# Patient Record
Sex: MALE | Race: WHITE | HISPANIC OR LATINO | Employment: FULL TIME | URBAN - METROPOLITAN AREA
[De-identification: names, ages, dates, MRNs, and addresses within clinical notes are randomized per-mention and may not be internally consistent; named-entity substitution may affect disease eponyms.]

---

## 2017-07-17 ENCOUNTER — ALLSCRIPTS OFFICE VISIT (OUTPATIENT)
Dept: OTHER | Facility: OTHER | Age: 62
End: 2017-07-17

## 2018-01-12 VITALS
HEIGHT: 70 IN | SYSTOLIC BLOOD PRESSURE: 167 MMHG | BODY MASS INDEX: 31.09 KG/M2 | WEIGHT: 217.13 LBS | DIASTOLIC BLOOD PRESSURE: 94 MMHG | HEART RATE: 58 BPM

## 2018-01-15 ENCOUNTER — ALLSCRIPTS OFFICE VISIT (OUTPATIENT)
Dept: OTHER | Facility: OTHER | Age: 63
End: 2018-01-15

## 2018-01-16 NOTE — PROGRESS NOTES
Assessment   1  Cervical myelopathy with cervical radiculopathy (721 1) (M47 12)    Plan   Cervical myelopathy with cervical radiculopathy    · Follow-up visit in 6 months Evaluation and Treatment  Follow-up  Status: Hold For -    Scheduling  Requested for: [id number]   Ordered; For: Cervical myelopathy with cervical radiculopathy; Ordered By: Dvaid Lainez Performed:  Due: [id number]    Discussion/Summary   Discussion Summary:    Eden Isabel anticipates returning back to work early next month and hopes to retire within the next 8-12 months  Does understand that due to his cervical disease/stenosis he is at risk for paralysis  If he notes increasing symptoms of balance difficulty weakness or numbness he should notify me  Chief Complaint   Chief Complaint Free Text Note Form: The patient returns today following up on neck pain and right leg numbness      History of Present Illness   HPI: Nata Pantoja returns in follow-up today  He reports that he underwent triple bypass surgery in late October after abnormalities were found on a stress test  He states he is going to cardiac rehab been recovering well  Occasionally notes issues of neck pain but states that by modifying his activities he has been able to minimize his symptoms  He does occasionally notes some numbness and tingling in the right leg and reports that sometimes prior to sleep at night his left leg will spasm  He does have some urinary urgency and anticipates seeing the neurologist in the near future  He has not noticed any problems with his balance  His blood pressure was elevated today and states that typically when he starts cardiac rehab the blood pressure is high but usually it normalizes by the end of therapy  He has discussed this with his cardiologist and anticipates doing it again in the near future      Review of Systems   Neurological ROS:      Cardiovascular: no chest pain or pressure-- and-- no palpitations present        Respiratory: no shortness of breath with or without exertion  Gastrointestinal: no abdominal pain  Genitourinary: urinary hesitancy  Musculoskeletal: head/neck/back pain-- and-- neck pain  Integumentary no rash:-- and-- no skin lesions  Psychiatric: sleep problems  Hematologic/Lymphatic: no unusual bleeding  Neurological General: trouble falling asleep-- and-- waking up at night, but-- no headache  Neurological Mental Status: no memory problems  Neurological Cranial Nerves: no vertigo or dizziness  Neurological Motor findings include: twitching-- and-- cramping(pre/post exercise)  Neurological Coordination: no vertigo or dizziness  Neurological Sensory: numbness  Neurological Gait: difficulty walking, but-- has not had falls  Active Problems   1  Cervical disc herniation (722 0) (M50 20)   2  Cervical myelopathy with cervical radiculopathy (721 1) (M47 12)   3  Cervical radiculopathy (723 4) (M54 12)   4  Cervical spinal cord compression (336 9) (G95 20)   5  Cervical spinal stenosis (723 0) (M48 02)   6  Hypercholesterolemia (272 0) (E78 00)   7  Lumbar radicular syndrome (724 4) (M54 16)   8  Overactive bladder (596 51) (N32 81)    Past Medical History   1  History of Arm pain, right (729 5) (M79 601)   2  History of CAD (coronary artery disease) (414 00) (I25 10)   3  History of arthritis (V13 4) (Z87 39)   4  History of blurred vision (V12 49) (Z86 69)   5  History of hypertension (V12 59) (Z86 79)   6  History of Imbalance (781 2) (R26 89)   7  History of Left facial numbness (782 0) (R20 0)   8  History of Neck pain (723 1) (M54 2)   9  History of Neck stiffness (723 5) (M43 6)   10  History of Right arm weakness (729 89) (Z43 921)  Active Problems And Past Medical History Reviewed: The active problems and past medical history were reviewed and updated today  Surgical History   1  History of Cath Stent Placement  Surgical History Reviewed:     The surgical history was reviewed and updated today  Family History   Mother    1  Family history of dementia (V17 2) (Z81 8)  Father    2  FH: heart attack (V17 3) (Z82 49)  Maternal Grandmother    3  Family history of diabetes mellitus (V18 0) (Z83 3)  Paternal Grandmother    4  Family history of Alzheimer's disease (V17 2) (Z82 0)  Paternal Grandfather    11  Family history of death of natural cause (V19 8) (Z80 80)  Family History Reviewed: The family history was reviewed and updated today  Social History    · Currently sexually active   · Former smoker (V15 82) (Z87 891)   · High school or GED   ·    · No drug use   · No known risk factors   · No known STD risk factors   · Occasional alcohol use   · Occupation   · Six children  Social History Reviewed: The social history was reviewed and updated today  Current Meds    1  Aspirin Low Dose 81 MG TABS; TAKE 1 TABLET DAILY; Therapy: (Recorded:22Jan2016) to Recorded   2  CoQ10 100 MG Oral Capsule; TAKE CAPSULE  PRN; Therapy: (Recorded:22Jan2016) to Recorded   3  Daily Multi Oral Tablet; TAKE 1 TABLET DAILY; Therapy: ([facility] [place]) to Recorded   4  Fish Oil CAPS; take 1 capsule daily; Therapy: ([facility] [place]) to Recorded   5  Lisinopril 20 MG Oral Tablet; TAKE 1 TABLET EVERY MORNING; Therapy: (Recorded:15Jan2018) to Recorded   6  Metoprolol Tartrate 50 MG Oral Tablet; TAKE 1 TABLET DAILY; Therapy: (Recorded:13Nov2015) to Recorded   7  Plavix 75 MG Oral Tablet; TAKE 1 TABLET DAILY; Therapy: (Recorded:13Nov2015) to Recorded   8  Tamsulosin HCl 0 4 MG CAPS; TAKE 1 CAPSULE DAILY; Therapy: (Recorded:22Jan2016) to Recorded   9  Vytorin 10-40 MG Oral Tablet; TAKE 1 TABLET DAILY AS DIRECTED; Therapy: (Recorded:22Jan2016) to Recorded  Medication List Reviewed: The medication list was reviewed and updated today  Allergies   1   No Known Drug Allergies    Vitals   Signs   Recorded: [id number] 12:48PM   Heart Rate: 62, L Radial  Pulse Quality: Regular, L Radial  Respiration: 16  Systolic: 238, LUE, Sitting  Diastolic: 111, LUE, Sitting  Height: 5 ft 10 in  Weight: 200 lb   BMI Calculated: 28 7  BSA Calculated: 2 09    Physical Exam        Constitutional      General appearance: No acute distress, well appearing and well nourished  Cardiovascular:  No carotid bruits     HEENT/NECK: Head is atraumatic normocephalic, neck is supple     NEUROLOGIC:     Mental Status: Awake and alert without aphasia     Cranial Nerves: Extraocular movements are full  Face is symmetrical     Motor:  No drift is noted on arm extension     Coordination:  Finger-to-nose testing is performed accurately  Romberg is negative    Gait is stable     Reflexes:  Trace in the biceps brachioradialis regions, 1+ at the right triceps and trace at the left triceps, trace at the knees and ankles      Signatures    Electronically signed by : Kristi Elmore MD; Bola 15 2018  1:13PM EST                       (Author)

## 2018-01-22 VITALS
HEART RATE: 62 BPM | SYSTOLIC BLOOD PRESSURE: 174 MMHG | HEIGHT: 70 IN | WEIGHT: 200 LBS | DIASTOLIC BLOOD PRESSURE: 110 MMHG | RESPIRATION RATE: 16 BRPM | BODY MASS INDEX: 28.63 KG/M2

## 2018-08-13 ENCOUNTER — OFFICE VISIT (OUTPATIENT)
Dept: NEUROLOGY | Facility: CLINIC | Age: 63
End: 2018-08-13
Payer: COMMERCIAL

## 2018-08-13 VITALS
WEIGHT: 202 LBS | HEART RATE: 48 BPM | DIASTOLIC BLOOD PRESSURE: 84 MMHG | SYSTOLIC BLOOD PRESSURE: 142 MMHG | HEIGHT: 70 IN | BODY MASS INDEX: 28.92 KG/M2

## 2018-08-13 DIAGNOSIS — M54.31 RIGHT SIDED SCIATICA: ICD-10-CM

## 2018-08-13 DIAGNOSIS — G95.20 CERVICAL SPINAL CORD COMPRESSION (HCC): Primary | ICD-10-CM

## 2018-08-13 PROCEDURE — 99214 OFFICE O/P EST MOD 30 MIN: CPT | Performed by: PSYCHIATRY & NEUROLOGY

## 2018-08-13 RX ORDER — LANOLIN ALCOHOL/MO/W.PET/CERES
1000 CREAM (GRAM) TOPICAL DAILY
COMMUNITY

## 2018-08-13 RX ORDER — ROSUVASTATIN CALCIUM 10 MG/1
1 TABLET, COATED ORAL DAILY
COMMUNITY

## 2018-08-13 RX ORDER — FINASTERIDE 5 MG/1
5 TABLET, FILM COATED ORAL DAILY
Refills: 11 | COMMUNITY
Start: 2018-06-27

## 2018-08-13 RX ORDER — EZETIMIBE AND SIMVASTATIN 10; 40 MG/1; MG/1
1 TABLET ORAL DAILY
COMMUNITY

## 2018-08-13 RX ORDER — EZETIMIBE 10 MG/1
1 TABLET ORAL DAILY
COMMUNITY

## 2018-08-13 NOTE — PROGRESS NOTES
Dwayne Reeder is a 61 y o  male with history of cervical spondylosis with cord compression now with numbness and pain in the right lower extremity    Assessment:  1  Cervical spinal cord compression (HCC)    2  Right sided sciatica        Plan:  Physical therapy  EMG right lower extremity  Follow-up 2 months    Discussion:  Kevyn De La Rosa has symptoms of right sciatica without back pain suggesting possible piriformis syndrome  Have recommended physical therapy, EMG of the right lower extremity and I will see him back in follow-up when these are completed  If his pain symptoms become more prominent would consider adding gabapentin      Subjective:    HPI  Kevyn De La Rosa reports that he has been exercising regularly and feels that his neck symptoms have improved a bit  He states on rare occasion he notes some numbness radiating toward his left cheek  He denies any worsening balance or weakness in his arms  He does report that since his cardiac surgery last year he has noticed a burning numb sensation radiating from his right heel up to his buttocks area posteriorly  He states that if he sits on a hard chair for any length of time he becomes symptomatic and on occasion notes with standing he worsened symptoms  He notes no definite weakness in the leg  He does not associate the symptoms with back pain  He states yesterday he was doing a lot of heavy yd work and did have some pain across his lower back but this seemed to go away with Aleve and did not notice any increasing symptoms in his right leg associated with this        Past Medical History:   Diagnosis Date    Arthritis     CAD (coronary artery disease)     history of     Cervical disc herniation     Cervical myelopathy with cervical radiculopathy     Cervical radiculopathy     Cervical spinal stenosis     Hypercholesterolemia     Hypertension     Lumbar radicular syndrome     Overactive bladder        Family History:  Family History   Problem Relation Age of Onset    Dementia Mother     Heart attack Father     Diabetes Maternal Grandmother     Alzheimer's disease Paternal Grandmother        Past Surgical History:  Past Surgical History:   Procedure Laterality Date    CORONARY ANGIOPLASTY WITH STENT PLACEMENT  02/2004    CORONARY ARTERY BYPASS GRAFT  10/31/2017    triple    LITHOTRIPSY Left 06/12/2018       Social History:   reports that he has quit smoking  He has never used smokeless tobacco  He reports that he drinks alcohol  He reports that he does not use drugs  Allergies:  Patient has no known allergies  Current Outpatient Prescriptions:     aspirin (ASPIRIN LOW DOSE) 81 MG tablet, Take 1 tablet by mouth daily, Disp: , Rfl:     cyanocobalamin (VITAMIN B-12) 1,000 mcg tablet, Take 1,000 mcg by mouth daily, Disp: , Rfl:     ezetimibe (ZETIA) 10 mg tablet, Take 1 tablet by mouth Daily, Disp: , Rfl:     ezetimibe-simvastatin (VYTORIN) 10-40 mg per tablet, Take 1 tablet by mouth daily, Disp: , Rfl:     finasteride (PROSCAR) 5 mg tablet, Take 5 mg by mouth daily, Disp: , Rfl: 11    Garlic 9564 MG CAPS, Take 1 capsule by mouth daily, Disp: , Rfl:     lisinopril (ZESTRIL) 20 mg tablet, Take 1 tablet by mouth every morning, Disp: , Rfl:     metoprolol succinate (TOPROL-XL) 50 mg 24 hr tablet, Take 50 mg by mouth daily, Disp: , Rfl: 3    Multiple Vitamins-Minerals (CENTRUM SILVER 50+MEN PO), Take 1 tablet by mouth daily, Disp: , Rfl:     Omega-3 Fatty Acids (FISH OIL) 645 MG CAPS, Take 1 capsule by mouth daily, Disp: , Rfl:     rosuvastatin (CRESTOR) 10 MG tablet, Take 1 tablet by mouth daily, Disp: , Rfl:     I have reviewed the past medical, social and family history, current medications, allergies, vitals, review of systems and updated this information as appropriate today     Objective:    Vitals:  Blood pressure 142/84, pulse (!) 48, height 5' 9 5" (1 765 m), weight 91 6 kg (202 lb)      Physical Exam    Neurological Exam  GENERAL: Well-developed well-nourished man in no acute distress  HEENT/NECK: Head is atraumatic normocephalic, neck is supple  CARDIOVASCULAR:  No Carotid bruit  MUSCULOSKELETAL:  Back is straight and nontender  He is able to flex to touch to the proximal 1/3 of the shin  He does report some pain lateralized to the right low lumbar region with lateral rotation to the left  Straight leg raising is positive on the right at 70° and negative on the left at 80°  With compression of the right sciatic notch she does report tingling sensation down the right leg  NEUROLOGIC:  Mental Status: Awake and alert without aphasia  Cranial Nerves: Extraocular movements are full  Face is symmetrical  Motor:  No focal weakness is noted motor testing  No drift is noted  Coordination:  Finger-to-nose testing is performed accurately  Romberg is negative  Gait is stable  Reflexes:  Trace to 1 and symmetrical in the knee jerk and ankle jerk regions            ROS:    Review of Systems   Constitutional: Negative for chills, fatigue and fever  HENT: Negative for congestion, postnasal drip, tinnitus and trouble swallowing  Eyes: Negative for pain and visual disturbance  Respiratory: Negative for shortness of breath and wheezing  Cardiovascular: Negative  Gastrointestinal: Negative for nausea and vomiting  Endocrine: Negative for cold intolerance and heat intolerance  Genitourinary: Negative for frequency, hematuria and urgency  Musculoskeletal: Positive for back pain and neck stiffness  Negative for arthralgias, gait problem and neck pain  Skin: Negative for rash and wound  Neurological: Positive for numbness  Negative for dizziness, tremors, seizures, syncope, facial asymmetry, speech difficulty, weakness, light-headedness and headaches  Hematological: Negative  Psychiatric/Behavioral: Negative for confusion, decreased concentration and sleep disturbance  All other systems reviewed and are negative

## 2018-11-19 ENCOUNTER — TELEPHONE (OUTPATIENT)
Dept: NEUROLOGY | Facility: CLINIC | Age: 63
End: 2018-11-19

## 2019-11-25 ENCOUNTER — APPOINTMENT (OUTPATIENT)
Dept: LAB | Facility: CLINIC | Age: 64
End: 2019-11-25
Payer: COMMERCIAL

## 2019-11-25 ENCOUNTER — TRANSCRIBE ORDERS (OUTPATIENT)
Dept: LAB | Facility: CLINIC | Age: 64
End: 2019-11-25

## 2019-11-25 LAB
ALBUMIN SERPL BCP-MCNC: 4.4 G/DL (ref 3.5–5)
ALP SERPL-CCNC: 68 U/L (ref 46–116)
ALT SERPL W P-5'-P-CCNC: 61 U/L (ref 12–78)
ANION GAP SERPL CALCULATED.3IONS-SCNC: 7 MMOL/L (ref 4–13)
AST SERPL W P-5'-P-CCNC: 37 U/L (ref 5–45)
BACTERIA UR QL AUTO: ABNORMAL /HPF
BILIRUB SERPL-MCNC: 0.76 MG/DL (ref 0.2–1)
BILIRUB UR QL STRIP: NEGATIVE
BUN SERPL-MCNC: 17 MG/DL (ref 5–25)
CALCIUM SERPL-MCNC: 9.4 MG/DL (ref 8.3–10.1)
CHLORIDE SERPL-SCNC: 109 MMOL/L (ref 100–108)
CHOLEST SERPL-MCNC: 176 MG/DL (ref 50–200)
CLARITY UR: ABNORMAL
CO2 SERPL-SCNC: 27 MMOL/L (ref 21–32)
COLOR UR: ABNORMAL
CREAT SERPL-MCNC: 1.08 MG/DL (ref 0.6–1.3)
ERYTHROCYTE [DISTWIDTH] IN BLOOD BY AUTOMATED COUNT: 12.5 % (ref 11.6–15.1)
GFR SERPL CREATININE-BSD FRML MDRD: 72 ML/MIN/1.73SQ M
GLUCOSE P FAST SERPL-MCNC: 98 MG/DL (ref 65–99)
GLUCOSE UR STRIP-MCNC: NEGATIVE MG/DL
HCT VFR BLD AUTO: 51.1 % (ref 36.5–49.3)
HDLC SERPL-MCNC: 38 MG/DL
HGB BLD-MCNC: 16.6 G/DL (ref 12–17)
HGB UR QL STRIP.AUTO: NEGATIVE
HYALINE CASTS #/AREA URNS LPF: ABNORMAL /LPF
KETONES UR STRIP-MCNC: ABNORMAL MG/DL
LDLC SERPL CALC-MCNC: 97 MG/DL (ref 0–100)
LEUKOCYTE ESTERASE UR QL STRIP: NEGATIVE
MCH RBC QN AUTO: 29.5 PG (ref 26.8–34.3)
MCHC RBC AUTO-ENTMCNC: 32.5 G/DL (ref 31.4–37.4)
MCV RBC AUTO: 91 FL (ref 82–98)
MUCOUS THREADS UR QL AUTO: ABNORMAL
NITRITE UR QL STRIP: NEGATIVE
NON-SQ EPI CELLS URNS QL MICRO: ABNORMAL /HPF
NONHDLC SERPL-MCNC: 138 MG/DL
PH UR STRIP.AUTO: 6 [PH]
PLATELET # BLD AUTO: 177 THOUSANDS/UL (ref 149–390)
PMV BLD AUTO: 13.4 FL (ref 8.9–12.7)
POTASSIUM SERPL-SCNC: 4.1 MMOL/L (ref 3.5–5.3)
PROT SERPL-MCNC: 7.6 G/DL (ref 6.4–8.2)
PROT UR STRIP-MCNC: ABNORMAL MG/DL
PSA SERPL-MCNC: 0.7 NG/ML (ref 0–4)
RBC # BLD AUTO: 5.63 MILLION/UL (ref 3.88–5.62)
RBC #/AREA URNS AUTO: ABNORMAL /HPF
SODIUM SERPL-SCNC: 143 MMOL/L (ref 136–145)
SP GR UR STRIP.AUTO: 1.03 (ref 1–1.03)
TRIGL SERPL-MCNC: 205 MG/DL
UROBILINOGEN UR QL STRIP.AUTO: 0.2 E.U./DL
WBC # BLD AUTO: 6.2 THOUSAND/UL (ref 4.31–10.16)
WBC #/AREA URNS AUTO: ABNORMAL /HPF

## 2019-11-25 PROCEDURE — 81001 URINALYSIS AUTO W/SCOPE: CPT | Performed by: FAMILY MEDICINE

## 2019-11-25 PROCEDURE — 85027 COMPLETE CBC AUTOMATED: CPT

## 2019-11-25 PROCEDURE — 36415 COLL VENOUS BLD VENIPUNCTURE: CPT

## 2019-11-25 PROCEDURE — G0103 PSA SCREENING: HCPCS

## 2019-11-25 PROCEDURE — 80061 LIPID PANEL: CPT

## 2019-11-25 PROCEDURE — 80053 COMPREHEN METABOLIC PANEL: CPT

## 2020-01-09 ENCOUNTER — TRANSCRIBE ORDERS (OUTPATIENT)
Dept: LAB | Facility: CLINIC | Age: 65
End: 2020-01-09

## 2020-01-09 ENCOUNTER — APPOINTMENT (OUTPATIENT)
Dept: LAB | Facility: CLINIC | Age: 65
End: 2020-01-09
Payer: COMMERCIAL

## 2020-01-09 DIAGNOSIS — R31.29 OTHER MICROSCOPIC HEMATURIA: ICD-10-CM

## 2020-01-09 DIAGNOSIS — R31.29 OTHER MICROSCOPIC HEMATURIA: Primary | ICD-10-CM

## 2020-01-09 LAB
ANION GAP SERPL CALCULATED.3IONS-SCNC: 3 MMOL/L (ref 4–13)
BUN SERPL-MCNC: 20 MG/DL (ref 5–25)
CALCIUM SERPL-MCNC: 9 MG/DL (ref 8.3–10.1)
CHLORIDE SERPL-SCNC: 109 MMOL/L (ref 100–108)
CO2 SERPL-SCNC: 28 MMOL/L (ref 21–32)
CREAT SERPL-MCNC: 0.95 MG/DL (ref 0.6–1.3)
GFR SERPL CREATININE-BSD FRML MDRD: 84 ML/MIN/1.73SQ M
GLUCOSE P FAST SERPL-MCNC: 102 MG/DL (ref 65–99)
POTASSIUM SERPL-SCNC: 4.2 MMOL/L (ref 3.5–5.3)
SODIUM SERPL-SCNC: 140 MMOL/L (ref 136–145)

## 2020-01-09 PROCEDURE — 36415 COLL VENOUS BLD VENIPUNCTURE: CPT

## 2020-01-09 PROCEDURE — 80048 BASIC METABOLIC PNL TOTAL CA: CPT

## 2021-04-02 ENCOUNTER — TELEPHONE (OUTPATIENT)
Dept: NEUROLOGY | Facility: CLINIC | Age: 66
End: 2021-04-02

## 2021-04-02 NOTE — TELEPHONE ENCOUNTER
Patient on waitlist has 6/22 f/u with Dr Anais Reyna  Offered April5 9:00 ETA 8:45    Requested call back if interested

## 2021-04-07 ENCOUNTER — OFFICE VISIT (OUTPATIENT)
Dept: NEUROLOGY | Facility: CLINIC | Age: 66
End: 2021-04-07
Payer: COMMERCIAL

## 2021-04-07 VITALS
WEIGHT: 211 LBS | HEIGHT: 70 IN | HEART RATE: 82 BPM | SYSTOLIC BLOOD PRESSURE: 160 MMHG | DIASTOLIC BLOOD PRESSURE: 110 MMHG | RESPIRATION RATE: 16 BRPM | BODY MASS INDEX: 30.21 KG/M2

## 2021-04-07 DIAGNOSIS — M54.16 RADICULOPATHY, LUMBAR REGION: ICD-10-CM

## 2021-04-07 DIAGNOSIS — M54.16 RADICULOPATHY, LUMBAR REGION: Primary | ICD-10-CM

## 2021-04-07 PROCEDURE — 99215 OFFICE O/P EST HI 40 MIN: CPT | Performed by: PSYCHIATRY & NEUROLOGY

## 2021-04-07 RX ORDER — GABAPENTIN 100 MG/1
100 CAPSULE ORAL AS NEEDED
COMMUNITY
Start: 2021-02-17

## 2021-04-07 RX ORDER — MELOXICAM 7.5 MG/1
TABLET ORAL
Qty: 30 TABLET | Refills: 3 | Status: SHIPPED | OUTPATIENT
Start: 2021-04-07

## 2021-04-07 RX ORDER — CLOPIDOGREL BISULFATE 75 MG/1
75 TABLET ORAL DAILY
COMMUNITY

## 2021-04-07 RX ORDER — MELOXICAM 7.5 MG/1
7.5 TABLET ORAL DAILY
Qty: 30 TABLET | Refills: 3 | Status: SHIPPED | OUTPATIENT
Start: 2021-04-07 | End: 2021-04-07 | Stop reason: SDUPTHER

## 2021-04-07 NOTE — PROGRESS NOTES
Kait Goncalves is a 77 y o  male returns today with complaints of back pain radiating to the lower extremities    Assessment:  1  Radiculopathy, lumbar region        Plan:  Meloxicam 7 5-15 mg daily as needed   Physical therapy   Follow-up 6 weeks    Discussion:   Yovana Addison reports approximately 4 month history of pain in the low back area radiating to the lower extremities left greater than right  He finds that sitting amplify his pain  He has been taking Tylenol and gabapentin but has not received significant relief ( he is taking 1 100 mg gabapentin daily )  No obvious focal weakness or dramatic sensory changes are noted  Hyporeflexia is noted at the ankles  Have recommended a trial of meloxicam for inflammatory pain and initiating physical therapy  If symptoms persist imaging and pain management may be indicated  I will see him back in follow-up in 6 weeks      Subjective:    SHERMAN Addison returns in follow-up today  He reports that he has been having pain in his low back area lateralized to the left radiating into the left greater than right lower extremity  He finds that sitting amplify his pain and 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  He also finds that bending or twisting will amplify his pain  He states occasionally the right leg feels weak and notes numbness in the legs bilaterally  He states he has been taking Tylenol and was prescribed gabapentin but is concerned about drowsiness so he is taking only 1 100 mg pill daily  He states his son is getting a degree in physical therapy and is also given him some tips which helped temporarily   He also reports a trigger finger in the left hand but does not feel this is bad enough to warrant referral at this point      Past Medical History:   Diagnosis Date    Arthritis     CAD (coronary artery disease)     history of     Cervical disc herniation     Cervical myelopathy with cervical radiculopathy     Cervical radiculopathy     Cervical spinal stenosis     Hypercholesterolemia     Hypertension     Lumbar radicular syndrome     Overactive bladder        Family History:  Family History   Problem Relation Age of Onset    Dementia Mother     Heart attack Father     Diabetes Maternal Grandmother     Alzheimer's disease Paternal Grandmother        Past Surgical History:  Past Surgical History:   Procedure Laterality Date    CORONARY ANGIOPLASTY WITH STENT PLACEMENT  02/2004    CORONARY ARTERY BYPASS GRAFT  10/31/2017    triple    LITHOTRIPSY Left 06/12/2018       Social History:   reports that he quit smoking about 33 years ago  His smoking use included cigarettes  He has a 1 50 pack-year smoking history  He has never used smokeless tobacco  He reports current alcohol use  He reports that he does not use drugs  Allergies:  Patient has no known allergies        Current Outpatient Medications:     aspirin (ASPIRIN LOW DOSE) 81 MG tablet, Take 1 tablet by mouth daily, Disp: , Rfl:     clopidogrel (PLAVIX) 75 mg tablet, Take 75 mg by mouth Daily, Disp: , Rfl:     cyanocobalamin (VITAMIN B-12) 1,000 mcg tablet, Take 1,000 mcg by mouth daily, Disp: , Rfl:     ezetimibe (ZETIA) 10 mg tablet, Take 1 tablet by mouth Daily, Disp: , Rfl:     ezetimibe-simvastatin (VYTORIN) 10-40 mg per tablet, Take 1 tablet by mouth daily, Disp: , Rfl:     finasteride (PROSCAR) 5 mg tablet, Take 5 mg by mouth daily, Disp: , Rfl: 11    gabapentin (NEURONTIN) 100 mg capsule, Take 100 mg by mouth as needed, Disp: , Rfl:     Garlic 3680 MG CAPS, Take 1 capsule by mouth daily, Disp: , Rfl:     lisinopril (ZESTRIL) 20 mg tablet, Take 1 tablet by mouth every morning, Disp: , Rfl:     metoprolol succinate (TOPROL-XL) 50 mg 24 hr tablet, Take 50 mg by mouth daily, Disp: , Rfl: 3    Multiple Vitamins-Minerals (CENTRUM SILVER 50+MEN PO), Take 1 tablet by mouth daily, Disp: , Rfl:     Omega-3 Fatty Acids (FISH OIL) 645 MG CAPS, Take 1 capsule by mouth daily, Disp: , Rfl:     rosuvastatin (CRESTOR) 10 MG tablet, Take 1 tablet by mouth daily, Disp: , Rfl:     TURMERIC PO, Take by mouth daily, Disp: , Rfl:     meloxicam (MOBIC) 7 5 mg tablet, Take 1 tablet (7 5 mg total) by mouth daily He take a 2nd pill p r n , no more than 2 per day, Disp: 30 tablet, Rfl: 3      I have reviewed the past medical, social and family history, current medications, allergies, vitals, review of systems and updated this information as appropriate today     Objective:    Vitals:  Blood pressure (!) 160/110, pulse 82, resp  rate 16, height 5' 9 5" (1 765 m), weight 95 7 kg (211 lb)  Physical Exam    Neurological Exam   GENERAL:  Well-developed well-nourished  man in no acute distress  HEENT/NECK: Head is atraumatic normocephalic, neck is supple  NEUROLOGIC:  Mental Status: Awake and alert without aphasia  Cranial Nerves: Extraocular movements are full  Face is symmetrical  Motor:  No focal weakness is noted motor testing in the distal or proximal lower extremities  Sensory:  Subtle decreased sharp dull sensation is noted over the lateral aspect of the left leg otherwise intact  Coordination: able to toe-walk and heel-walk  Reflexes:    1+ at the knees and absent at the ankles  Toes are downgoing            ROS:    Review of Systems   Constitutional: Negative  Negative for appetite change and fever  HENT: Negative  Negative for hearing loss, tinnitus, trouble swallowing and voice change  Eyes: Negative  Negative for photophobia and pain  Respiratory: Negative  Negative for shortness of breath  Cardiovascular: Negative  Negative for palpitations  Gastrointestinal: Negative  Negative for nausea and vomiting  Endocrine: Negative  Negative for cold intolerance  Genitourinary: Negative  Negative for dysuria, frequency and urgency  Musculoskeletal: Positive for gait problem  Negative for myalgias and neck pain          Sciatica- Pain score: 10/10 Skin: Negative  Negative for rash  Neurological: Negative for dizziness, tremors, seizures, syncope, facial asymmetry, speech difficulty, weakness, light-headedness, numbness and headaches  Hematological: Negative  Does not bruise/bleed easily  Psychiatric/Behavioral: Negative  Negative for confusion, hallucinations and sleep disturbance

## 2021-05-03 ENCOUNTER — EVALUATION (OUTPATIENT)
Dept: PHYSICAL THERAPY | Facility: CLINIC | Age: 66
End: 2021-05-03
Payer: COMMERCIAL

## 2021-05-03 DIAGNOSIS — M54.16 RADICULOPATHY, LUMBAR REGION: Primary | ICD-10-CM

## 2021-05-03 PROCEDURE — 97110 THERAPEUTIC EXERCISES: CPT | Performed by: PHYSICAL THERAPIST

## 2021-05-03 PROCEDURE — 97161 PT EVAL LOW COMPLEX 20 MIN: CPT | Performed by: PHYSICAL THERAPIST

## 2021-05-03 NOTE — PROGRESS NOTES
PT Evaluation     Today's date: 5/3/2021  Patient name: Jammie Dailey  : 1955  MRN: [id number]  Referring provider: Charity Medina MD  Dx:   Encounter Diagnosis     ICD-10-CM    1  Radiculopathy, lumbar region  M54 16 Ambulatory referral to Physical Therapy                  Assessment  Assessment details: Patient was provided a home exercise program and demonstrated an understanding of exercises  Patient was advised to stop performing home exercise program if symptoms increase or new complaints developed  Verbal understanding demonstrated regarding home exercise program instructions  Patient would benefit from skilled physical therapy services for prescribed exercises, manual interventions, neuromuscular re-education, education, and modalities as deemed appropriate to assist patient in achieving their maximum level of function  Patient presents with L > R LS pain - intermittent radiating symptoms to left lateral calf  Evaluation reveals:  Imbalance between L/R with flexibility and muscular tightness  Right quad tighter, good strength, loss of trunk mobility, hypomobility lumbar spine  Flexion bias activities will be initiated today with response following HEP monitored next session  Patient presents highly motivated to reduce/ abolish his pain -  He is retiring in 3 weeks  Impairments: abnormal gait, abnormal or restricted ROM, activity intolerance, lacks appropriate home exercise program and pain with function  Understanding of Dx/Px/POC: good  Goals  STG   1  Patient will demonstrate independence and competence with HEP 2 -4 weeks  2  Patient will report > 25-50% reduced pain 2-4 weeks    LTG   1  Patient will report improvements with both functional and recreational abilities  4-6 weeks  2  Patient will demonstrate improved LE flexibility  4-6 weeks  3   Patient will report centralized le symptoms  4-6 weeks         Plan  Plan details: Patient response to treatment will be monitored each session and progressed accordingly    Thank you for this referral    Patient would benefit from: skilled physical therapy  Planned modality interventions: cryotherapy and thermotherapy: hydrocollator packs  Planned therapy interventions: IADL retraining, joint mobilization, manual therapy, patient education, postural training, strengthening, stretching, therapeutic exercise, flexibility, home exercise program and neuromuscular re-education  Frequency: 2x week  Duration in weeks: 8  Treatment plan discussed with: patient        Subjective Evaluation    History of Present Illness  Mechanism of injury: 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  Approximately - 3 months ago, he stepped down from the high step to exit back - he felt a jarring in back     The pain intensified prompting him to go to MD   He has underlying cervical pain which he states also included left radicular / sciatic pain  He also reports - associated with his neck , he has right le numbness  He reports that his neck pain has improved due to exercising it more  His pain is left LS region with intermittent left LE radicular pain to lateral calf  He notes right le numbness 'stays"    He notes that occasionally , his right leg will be weak and buckle  Pain  Current pain ratin  At worst pain ratin  Quality: dull ache  Relieving factors: medications  Aggravating factors: standing and walking  Progression: no change    Social Support  Lives with: adult children and spouse    Employment status: working (3 weeks to [employer])  Treatments  Current treatment: medication  Patient Goals  Patient goals for therapy: decreased pain, increased strength and return to sport/leisure activities          Objective     Concurrent Complaints  Positive for disturbed sleep   Negative for night pain, bladder dysfunction, bowel dysfunction and saddle (S4) numbness    Postural Observations  Seated posture: fair  Standing posture: fair  Correction of posture: has no consistent effect    Additional Postural Observation Details  Patient guarded with posturing today  - flattened lordosis, shoulders slightly retracted  Tenderness     Lumbar Spine  Tenderness in the spinous process  Left Hip   No tenderness in the PSIS  Right Hip   No tenderness in the PSIS       Neurological Testing     Sensation     Lumbar   Left   Intact: light touch    Right   Intact: light touch    Active Range of Motion     Lumbar   Flexion:  Restriction level: minimal  Extension:  Restriction level: minimal    Joint Play   Joints within functional limits: L2, L3 and L4     Hypomobile: L2, L3, L4 and L5   Mechanical Assessment    Cervical      Thoracic      Lumbar    Standing flexion: repeated movements   Pain intensity: better  Pain level: decreased  Lying flexion: repeated movements  Pain intensity: better  Standing extension: repeated movements  Pain location: no change  Pain level: decreased  Lying extension: repeated movements  Pain location: no change    Strength/Myotome Testing     Lumbar   Left   Normal strength    Right   Normal strength    Additional Strength Details  (+) core weakness - fair    Tests     Lumbar     Left   Negative crossed SLR, femoral stretch, passive SLR and slump test      Right   Negative crossed SLR, femoral stretch, passive SLR and slump test      General Comments:      Lumbar Comments  GAIT - slowed deborah, shortened stride bilaterally    Hip Comments   R > L quad tightness  - right stretch produces right LS pain/ tightness    (+) HS tightness bilaterally  SLR = 70 degrees  (-) neural tension     AROM - WFL bilaterally              Precautions: LBP, intermittent leftradicular pain - lateral calf, right LE numbness    PMH- severe cervical stenosis        Manuals 5/3            FOTO db                         Manual HSS             Manual quad stretch db                                                   Neuro Re-Ed             aktc / dktc 5/10s x 10 ea            ltr 5s x 10            PPT 3s x 20             HL TA w/ roll             HL BKFO w/ TB                                       Ther Ex             bike             hss w/ strap 20s x 5            Prone quad stretch w/ strap                                                                                                                                               Ther Activity                                       Gait Training                                       Modalities             CP seated LS 8'

## 2021-05-06 ENCOUNTER — OFFICE VISIT (OUTPATIENT)
Dept: PHYSICAL THERAPY | Facility: CLINIC | Age: 66
End: 2021-05-06
Payer: COMMERCIAL

## 2021-05-06 DIAGNOSIS — M54.16 RADICULOPATHY, LUMBAR REGION: Primary | ICD-10-CM

## 2021-05-06 PROCEDURE — 97112 NEUROMUSCULAR REEDUCATION: CPT | Performed by: PHYSICAL THERAPIST

## 2021-05-06 PROCEDURE — 97110 THERAPEUTIC EXERCISES: CPT | Performed by: PHYSICAL THERAPIST

## 2021-05-06 NOTE — PROGRESS NOTES
Daily Note     Today's date: 2021  Patient name: Lara Constantino  : 1955  MRN: [id number]  Referring provider: Rhoderick Sandhoff, MD  Dx:   Encounter Diagnosis     ICD-10-CM    1  Radiculopathy, lumbar region  M54 16                   Subjective: Patient reports that he had to sit on a hard bench for lunch at work 2 days ago and had flared up of pain / symptoms left  -  Feeling slightly better today overall  Called off of work last 2 days  Reports no change in right LE numbness , left calf pain "has not been there"  Objective: See treatment diary below      Assessment: Tolerated treatment fair  Patient demonstrated fatigue post treatment and would benefit from continued PT   R > L low lumbar paraspinals tight/ over activate with movement      Plan: Continue per plan of care  Progress treatment as tolerated         Precautions: LBP, intermittent left radicular pain - lateral calf, right LE numbness    PMH- severe cervical stenosis        Manuals 5/3 5/6           FOTO db                         Manual HSS  db           Manual quad stretch db db           Left glut/ piriformis stretch   db                        IASTM R > L  L paraspinals   db           Neuro Re-Ed             aktc / dktc 5/10s x 10 ea 5/10s x 10 ea            ltr 5s x 10 5s x 10            PPT 3s x 20  3s x 20            HL TA w/ roll  nv           HL BKFO w/ TB  gtb 3s x 20                                      Ther Ex             bike  8'           hss w/ strap 20s x 5            Prone quad stretch w/ strap                          Prone lying  1 min           Prop on elbows  1 min           Press ups  5x           Press up w/ op  8x                                                                            Ther Activity                                       Gait Training                                       Modalities             CP seated LS 8'  10'            Trial MHP to end

## 2021-05-19 ENCOUNTER — OFFICE VISIT (OUTPATIENT)
Dept: PHYSICAL THERAPY | Facility: CLINIC | Age: 66
End: 2021-05-19
Payer: COMMERCIAL

## 2021-05-19 DIAGNOSIS — M54.16 RADICULOPATHY, LUMBAR REGION: Primary | ICD-10-CM

## 2021-05-19 PROCEDURE — 97112 NEUROMUSCULAR REEDUCATION: CPT

## 2021-05-19 PROCEDURE — 97014 ELECTRIC STIMULATION THERAPY: CPT

## 2021-05-19 NOTE — PROGRESS NOTES
Daily Note     Today's date: 2021  Patient name: Andrew Lorenzo  : 1955  MRN: [id number]  Referring provider: Izabela Knott MD  Dx:   Encounter Diagnosis     ICD-10-CM    1  Radiculopathy, lumbar region  M54 16        Start Time: 1033          Subjective: Initially patient phoned department and wanted to cancel appointment secondary to back pain  Encouraged patient to attend visit  Upon presentation, patient reported SPR =9/10 with pain over right SI juncture and radicular symptoms into right buttock and posterior right leg to knee  Post intervention, pain was localized to right SI only and SPR decreased to 4/10  Objective: See treatment diary below      Assessment: Tolerated treatment fair  Patient exhibited good technique with therapeutic exercises and would benefit from continued PT      Plan: Continue per plan of care  Progress treatment as tolerated         Precautions: LBP, intermittent left radicular pain - lateral calf, right LE numbness    PMH- severe cervical stenosis        Manuals 5/3 5/6 5/19          FOTO db                         Manual HSS  db LA  PTA          Manual quad stretch db db LA  PTA          Left glut/ piriformis stretch   db LA  PTA          R LE distraction   LA  PTA          IASTM R > L  L paraspinals   db           Neuro Re-Ed             aktc / dktc 5/10s x 10 ea 5/10s x 10 ea  :05/:10  10x          ltr 5s x 10 5s x 10  :05  10x          PPT 3s x 20  3s x 20  :03  20x          HL TA w/ roll  nv :05  20x          HL BKFO w/ TB  gtb 3s x 20                                      Ther Ex             bike  8'           hss w/ strap 20s x 5            Prone quad stretch w/ strap                          Prone lying  1 min           Prop on elbows  1 min           Press ups  5x           Press up w/ op  8x                                                                            Ther Activity                                       Gait Training Modalities             CP seated LS 8'  10'            Moist heat /TENS to LS seated   Entire session          Trial MHP to end

## 2021-05-21 ENCOUNTER — APPOINTMENT (OUTPATIENT)
Dept: PHYSICAL THERAPY | Facility: CLINIC | Age: 66
End: 2021-05-21
Payer: COMMERCIAL

## 2021-05-28 NOTE — PROGRESS NOTES
PT Discharge    Today's date: 2021  Patient name: Kunal Centeno  : 1955  MRN: [id number]  Referring provider: Grace Amado MD  Dx:   Encounter Diagnosis     ICD-10-CM    1  Radiculopathy, lumbar region  M54 16          Assessment  Assessment details: Patient did not attend sessions after 2021 thus final measurements are not available  On last session - he had a flare up of pain due to work and having to sit on hard surface for prolonged period   He was slated to retire at the end of that week  He was demonstrating independence and competence with HEP    Thank you for this referral    MD follow up prn  Impairments: abnormal gait, abnormal or restricted ROM, activity intolerance, lacks appropriate home exercise program and pain with function  Understanding of Dx/Px/POC: good  Goals  STG   1  Patient will demonstrate independence and competence with HEP 2 -4 weeks  MET to date  2  Patient will report > 25-50% reduced pain 2-4 weeks  NOT MET    LTG   PARTIALLY MET  1  Patient will report improvements with both functional and recreational abilities  4-6 weeks  2  Patient will demonstrate improved LE flexibility  4-6 weeks  3   Patient will report centralized le symptoms  4-6 weeks  Plan  Plan details: Thank you for this referral    Planned therapy interventions: home exercise program        Subjective Evaluation    Pain  Current pain ratin  At worst pain ratin  Quality: dull ache  Relieving factors: medications  Aggravating factors: standing and walking  Progression: no change    Social Support  Lives with: adult children and spouse    Employment status: working (3 weeks to [employer])  Treatments  Current treatment: medication  Patient Goals  Patient goals for therapy: decreased pain, increased strength and return to sport/leisure activities          Objective     Concurrent Complaints  Positive for disturbed sleep   Negative for night pain, bladder dysfunction, bowel dysfunction and saddle (S4) numbness    Postural Observations  Seated posture: fair  Standing posture: fair  Correction of posture: has no consistent effect    Additional Postural Observation Details  Patient guarded with posturing today  - flattened lordosis, shoulders slightly retracted  Tenderness     Lumbar Spine  Tenderness in the spinous process  Left Hip   No tenderness in the PSIS  Right Hip   No tenderness in the PSIS       Neurological Testing     Sensation     Lumbar   Left   Intact: light touch    Right   Intact: light touch    Active Range of Motion     Lumbar   Flexion:  Restriction level: minimal  Extension:  Restriction level: minimal    Joint Play   Joints within functional limits: L2, L3 and L4     Hypomobile: L2, L3, L4 and L5   Mechanical Assessment    Cervical      Thoracic      Lumbar    Standing flexion: repeated movements   Pain intensity: better  Pain level: decreased  Lying flexion: repeated movements  Pain intensity: better  Standing extension: repeated movements  Pain location: no change  Pain level: decreased  Lying extension: repeated movements  Pain location: no change    Strength/Myotome Testing     Lumbar   Left   Normal strength    Right   Normal strength    Additional Strength Details  (+) core weakness - fair    Tests     Lumbar     Left   Negative crossed SLR, femoral stretch, passive SLR and slump test      Right   Negative crossed SLR, femoral stretch, passive SLR and slump test      General Comments:      Lumbar Comments  GAIT - slowed deborah, shortened stride bilaterally    Hip Comments   R > L quad tightness  - right stretch produces right LS pain/ tightness    (+) HS tightness bilaterally  SLR = 70 degrees  (-) neural tension     AROM - WFL bilaterally              Precautions: LBP, intermittent leftradicular pain - lateral calf, right LE numbness    PMH- severe cervical stenosis

## 2021-06-08 ENCOUNTER — TELEPHONE (OUTPATIENT)
Dept: NEUROLOGY | Facility: CLINIC | Age: 66
End: 2021-06-08

## 2021-06-22 ENCOUNTER — OFFICE VISIT (OUTPATIENT)
Dept: NEUROLOGY | Facility: CLINIC | Age: 66
End: 2021-06-22
Payer: COMMERCIAL

## 2021-06-22 VITALS
WEIGHT: 203 LBS | HEIGHT: 70 IN | SYSTOLIC BLOOD PRESSURE: 140 MMHG | HEART RATE: 50 BPM | DIASTOLIC BLOOD PRESSURE: 90 MMHG | BODY MASS INDEX: 29.06 KG/M2

## 2021-06-22 DIAGNOSIS — M54.16 RADICULOPATHY, LUMBAR REGION: Primary | ICD-10-CM

## 2021-06-22 PROCEDURE — 99213 OFFICE O/P EST LOW 20 MIN: CPT | Performed by: PSYCHIATRY & NEUROLOGY

## 2021-06-22 RX ORDER — METOPROLOL TARTRATE 100 MG/1
TABLET ORAL
COMMUNITY
Start: 2021-05-24

## 2021-06-22 RX ORDER — ROSUVASTATIN CALCIUM 20 MG/1
20 TABLET, COATED ORAL DAILY
COMMUNITY
Start: 2021-04-26

## 2021-06-22 RX ORDER — VITAMIN B COMPLEX
TABLET ORAL DAILY
COMMUNITY

## 2021-06-22 NOTE — PROGRESS NOTES
Shea Beach is a 77 y o  male  Returns in follow-up today with history of low back pain  Assessment:  1  Radiculopathy, lumbar region        Plan:   continue home exercises   Follow-up 3 months    Discussion:   Clent Mohs is found improvement in his back pain symptoms with physical therapy and his continued home exercise program   He does still have some pain lateralized to the left with occasional pain radiating toward the buttocks area  He no longer has pain radiating into the lower extremity  He has lost about 10 lb and anticipates losing more weight which he feels may also help  He will continue current management and if symptoms worsen consider initiating physical therapy or pain management  I will see him back in follow-up in 3 months      Subjective:    HPI   Clent Mohs returns in follow-up today  He reports that he did go to physical therapy  He missed his last few sessions due to a death in the family  He states he has continued his home exercises and has found that there has been some improvement in his back pain symptoms  He states he continues to have some pain in the low back area lateralized to the left  He states after he does his stretches he will sometimes have some pain radiating toward the gluteal area on the left  He states he usually ice his afterwards and if this is not effective in relieving his pain he uses heat and gets relief  He states he has been attempting to lose weight and so far has lost about 10 lb    He anticipates formally retiring from his job this week      Past Medical History:   Diagnosis Date    Arthritis     CAD (coronary artery disease)     history of     Cervical disc herniation     Cervical myelopathy with cervical radiculopathy (HCC)     Cervical radiculopathy     Cervical spinal stenosis     Hypercholesterolemia     Hypertension     Lumbar radicular syndrome     Overactive bladder        Family History:  Family History   Problem Relation Age of Onset    Dementia Mother     Heart attack Father     Diabetes Maternal Grandmother     Alzheimer's disease Paternal Grandmother        Past Surgical History:  Past Surgical History:   Procedure Laterality Date    CORONARY ANGIOPLASTY WITH STENT PLACEMENT  02/2004    CORONARY ARTERY BYPASS GRAFT  10/31/2017    triple    LITHOTRIPSY Left 06/12/2018       Social History:   reports that he quit smoking about 33 years ago  His smoking use included cigarettes  He has a 1 50 pack-year smoking history  He has never used smokeless tobacco  He reports current alcohol use  He reports that he does not use drugs  Allergies:  Patient has no known allergies        Current Outpatient Medications:     aspirin (ASPIRIN LOW DOSE) 81 MG tablet, Take 1 tablet by mouth daily, Disp: , Rfl:     clopidogrel (PLAVIX) 75 mg tablet, Take 75 mg by mouth Daily, Disp: , Rfl:     Coenzyme Q10 (CoQ10) 100 MG CAPS, Take by mouth daily, Disp: , Rfl:     cyanocobalamin (VITAMIN B-12) 1,000 mcg tablet, Take 1,000 mcg by mouth daily, Disp: , Rfl:     ezetimibe (ZETIA) 10 mg tablet, Take 1 tablet by mouth Daily, Disp: , Rfl:     finasteride (PROSCAR) 5 mg tablet, Take 5 mg by mouth daily, Disp: , Rfl: 11    Garlic 4487 MG CAPS, Take 1 capsule by mouth daily, Disp: , Rfl:     lisinopril (ZESTRIL) 20 mg tablet, Take 1 tablet by mouth every morning, Disp: , Rfl:     meloxicam (MOBIC) 7 5 mg tablet, TAKE 1 TABLET (7 5MG) BY MOUTH EVERY DAY -2ND TAB AS NEEDED -NO MORE THAN 2 TABS/DAY, Disp: 30 tablet, Rfl: 3    meloxicam (MOBIC) 7 5 mg tablet, One p o  q day or as directed, Disp: 30 tablet, Rfl: 3    metoprolol tartrate (LOPRESSOR) 100 mg tablet, , Disp: , Rfl:     Multiple Vitamins-Minerals (CENTRUM SILVER 50+MEN PO), Take 1 tablet by mouth daily, Disp: , Rfl:     Omega-3 Fatty Acids (FISH OIL) 645 MG CAPS, Take 1 capsule by mouth daily, Disp: , Rfl:     rosuvastatin (CRESTOR) 20 MG tablet, Take 20 mg by mouth daily, Disp: , Rfl:    TURMERIC PO, Take by mouth daily, Disp: , Rfl:     ezetimibe-simvastatin (VYTORIN) 10-40 mg per tablet, Take 1 tablet by mouth daily (Patient not taking: Reported on 6/22/2021), Disp: , Rfl:     gabapentin (NEURONTIN) 100 mg capsule, Take 100 mg by mouth as needed (Patient not taking: Reported on 6/22/2021), Disp: , Rfl:     metoprolol succinate (TOPROL-XL) 50 mg 24 hr tablet, Take 50 mg by mouth daily (Patient not taking: Reported on 6/22/2021), Disp: , Rfl: 3    rosuvastatin (CRESTOR) 10 MG tablet, Take 1 tablet by mouth daily (Patient not taking: Reported on 6/22/2021), Disp: , Rfl:      I have reviewed the past medical, social and family history, current medications, allergies, vitals, review of systems and updated this information as appropriate today     Objective:    Vitals:  Blood pressure 140/90, pulse (!) 50, height 5' 9 5" (1 765 m), weight 92 1 kg (203 lb)  Physical Exam    Neurological Exam   GENERAL:  Well-developed well-nourished  man in no acute distress  HEENT/NECK: Head is atraumatic normocephalic, neck is supple  NEUROLOGIC:  Mental Status: Awake and alert without aphasia  Cranial Nerves: Extraocular movements are full  Face is symmetrical  Coordination:  Gait is stable        ROS:    Review of Systems   Constitutional: Negative  Negative for appetite change and fever  HENT: Negative  Negative for hearing loss, tinnitus, trouble swallowing and voice change  Eyes: Negative  Negative for photophobia and pain  Respiratory: Negative  Negative for shortness of breath  Cardiovascular: Negative  Negative for palpitations  Gastrointestinal: Negative  Negative for nausea and vomiting  Endocrine: Negative  Negative for cold intolerance  Genitourinary: Negative  Negative for dysuria, frequency and urgency  Musculoskeletal: Positive for back pain and gait problem  Negative for myalgias and neck pain  Right leg pain   Skin: Negative  Negative for rash     Neurological: Negative for dizziness, tremors, seizures, syncope, facial asymmetry, speech difficulty, weakness, light-headedness, numbness and headaches  Hematological: Negative  Does not bruise/bleed easily  Psychiatric/Behavioral: Negative  Negative for confusion, hallucinations and sleep disturbance

## 2021-09-21 ENCOUNTER — TELEPHONE (OUTPATIENT)
Dept: NEUROLOGY | Facility: CLINIC | Age: 66
End: 2021-09-21

## 2021-09-21 NOTE — LETTER
September 21, 2021     Dominique Townsend  [address] [facility] [address]     Dear Mr Grzegorz Nina,    Appointment Missed: 9/21/2021      Appointment Scheduled with: Cornell Miller MD    We understand that many situations arise that occasionally prevents patients from keeping scheduled appointments  It is the policy of [facility] that patients notify us 24 hours in advance if unable to keep a scheduled appointment  Missed appointments jeopardize strong patient-physician relationships and may compromise our ability to provide you with appropriate quality medical care  The appointment you missed could have easily been made available to another patient if you had contacted us to cancel  We like to accommodate all of our patients, but when patients miss an appointment it prevents us from being able to help everyone  In the future, we request at least a 24 hour notice of cancellation so we can make your appointment available to someone else in need  At this time, you have missed two or more appointments  Should you miss another appointment or cancel with failure to give a 24 hour notice, we will no longer be able to maintain a patient-physician relationship and may dismiss you from the practice        Sincerely,      The Physicians and Staff of [facility]